# Patient Record
(demographics unavailable — no encounter records)

---

## 2025-07-02 NOTE — PHYSICAL EXAM
[de-identified] : middle aged man, well appearing, no acute distress  [de-identified] : normal  [de-identified] : normal  [de-identified] : breathing comfortably on room air  [de-identified] : normal rate and rhythm  [de-identified] : deferred  [de-identified] : inferior portion of midline incision open approximately 2 cm, scant serous drainage, no malodor, minimal to no surrounding erythema, remainder of incision well healed, laparoscopic incisions well healed  [de-identified] : deferred  [de-identified] : deferred  [de-identified] : normal  [de-identified] : normal  [de-identified] : awake and alert, answering questions appropriately  [de-identified] : normal and appropriate

## 2025-07-02 NOTE — PLAN
[FreeTextEntry1] : No pathology available yet - will call pathology next week if no results available.   Continue antibiotics for midline wound. Cultures +streptococcus gallyticus and eggerthella lenta. Discussed microbes with the patient and his wife.   OK to shower and let soap and water run over the incision and pat it dry after showering. Continue with excellent local wound care.   Remainder of staples removed.   Return to clinic in 2 weeks for wound re-check. Will have pathology results ready by then.   They know to call with any questions, concerns or issues before then.

## 2025-07-02 NOTE — HISTORY OF PRESENT ILLNESS
[de-identified] : 60 year old man with a history of obesity, DM, ESRD s/p DDRT (5/15/22) who presented with septic shock secondary to perforated appendicitis, s/p laparoscopic converted to open ileocecectomy on 6/17/25. His postoperative course was complicated by euglycemic DKA s/p insulin and bicarbonate drip. He was discharged home on 6/27. He presented to ED on 6/28 with erythema and drainage from the inferior portion of the midline wound. Cultures were taken and several staples were removed.  [de-identified] : He presents to clinic today for follow up. He has been doing well since his visit to the ED. The erythema around the wound has receded dramatically and the drainage is minimal. He denies any fevers. He has no abdominal pain and is having normal bowel movements. His appetite is overall decreased, but he has been tolerating what he is eating.

## 2025-07-02 NOTE — ASSESSMENT
[FreeTextEntry1] : Perforated appendicitis s/p laparoscopic converted to open ileocecectomy on 6/17/25.